# Patient Record
Sex: FEMALE | Race: BLACK OR AFRICAN AMERICAN | NOT HISPANIC OR LATINO | Employment: FULL TIME | URBAN - METROPOLITAN AREA
[De-identification: names, ages, dates, MRNs, and addresses within clinical notes are randomized per-mention and may not be internally consistent; named-entity substitution may affect disease eponyms.]

---

## 2021-02-15 ENCOUNTER — HOSPITAL ENCOUNTER (EMERGENCY)
Facility: HOSPITAL | Age: 56
Discharge: HOME | End: 2021-02-15
Attending: EMERGENCY MEDICINE | Admitting: EMERGENCY MEDICINE
Payer: COMMERCIAL

## 2021-02-15 ENCOUNTER — APPOINTMENT (EMERGENCY)
Dept: RADIOLOGY | Facility: HOSPITAL | Age: 56
End: 2021-02-15
Payer: COMMERCIAL

## 2021-02-15 VITALS
OXYGEN SATURATION: 100 % | DIASTOLIC BLOOD PRESSURE: 107 MMHG | RESPIRATION RATE: 18 BRPM | HEART RATE: 79 BPM | SYSTOLIC BLOOD PRESSURE: 142 MMHG | TEMPERATURE: 97.7 F | WEIGHT: 182 LBS
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
## 2021-02-15 DIAGNOSIS — S09.90XA INJURY OF HEAD, INITIAL ENCOUNTER: ICD-10-CM

## 2021-02-15 DIAGNOSIS — M54.2 NECK PAIN: ICD-10-CM

## 2021-02-15 DIAGNOSIS — R10.9 ABDOMINAL PAIN: ICD-10-CM

## 2021-02-15 DIAGNOSIS — M54.9 BACK PAIN: ICD-10-CM

## 2021-02-15 LAB
ANION GAP SERPL CALCULATED.3IONS-SCNC: 4 MMOL/L (ref 4–13)
BUN SERPL-MCNC: 19 MG/DL (ref 5–25)
CALCIUM SERPL-MCNC: 10.6 MG/DL (ref 8.3–10.1)
CHLORIDE SERPL-SCNC: 100 MMOL/L (ref 100–108)
CO2 SERPL-SCNC: 33 MMOL/L (ref 21–32)
CREAT SERPL-MCNC: 0.92 MG/DL (ref 0.6–1.3)
GFR SERPL CREATININE-BSD FRML MDRD: 80 ML/MIN/1.73SQ M
GLUCOSE SERPL-MCNC: 316 MG/DL (ref 65–140)
POTASSIUM SERPL-SCNC: 3.1 MMOL/L (ref 3.5–5.3)
SODIUM SERPL-SCNC: 137 MMOL/L (ref 136–145)

## 2021-02-15 PROCEDURE — 36415 COLL VENOUS BLD VENIPUNCTURE: CPT | Performed by: EMERGENCY MEDICINE

## 2021-02-15 PROCEDURE — 96374 THER/PROPH/DIAG INJ IV PUSH: CPT

## 2021-02-15 PROCEDURE — 74177 CT ABD & PELVIS W/CONTRAST: CPT

## 2021-02-15 PROCEDURE — 72125 CT NECK SPINE W/O DYE: CPT

## 2021-02-15 PROCEDURE — 80048 BASIC METABOLIC PNL TOTAL CA: CPT | Performed by: EMERGENCY MEDICINE

## 2021-02-15 PROCEDURE — 70450 CT HEAD/BRAIN W/O DYE: CPT

## 2021-02-15 PROCEDURE — 99284 EMERGENCY DEPT VISIT MOD MDM: CPT | Performed by: EMERGENCY MEDICINE

## 2021-02-15 PROCEDURE — 71260 CT THORAX DX C+: CPT

## 2021-02-15 PROCEDURE — G1004 CDSM NDSC: HCPCS

## 2021-02-15 PROCEDURE — 99284 EMERGENCY DEPT VISIT MOD MDM: CPT

## 2021-02-15 RX ORDER — KETOROLAC TROMETHAMINE 30 MG/ML
15 INJECTION, SOLUTION INTRAMUSCULAR; INTRAVENOUS ONCE
Status: COMPLETED | OUTPATIENT
Start: 2021-02-15 | End: 2021-02-15

## 2021-02-15 RX ADMIN — KETOROLAC TROMETHAMINE 15 MG: 30 INJECTION, SOLUTION INTRAMUSCULAR; INTRAVENOUS at 16:14

## 2021-02-15 RX ADMIN — IOHEXOL 100 ML: 350 INJECTION, SOLUTION INTRAVENOUS at 17:53

## 2021-02-15 NOTE — Clinical Note
Marine Thibodeaux was seen and treated in our emergency department on 2/15/2021  Diagnosis:     Gonzalez  may return to work on return date  She may return on this date: 02/18/2021         If you have any questions or concerns, please don't hesitate to call        Concetta Lucio MD    ______________________________           _______________          _______________  Hospital Representative                              Date                                Time

## 2021-02-15 NOTE — ED ATTENDING ATTESTATION
2/15/2021  ILupe MD, saw and evaluated the patient  I have discussed the patient with the resident/non-physician practitioner and agree with the resident's/non-physician practitioner's findings, Plan of Care, and MDM as documented in the resident's/non-physician practitioner's note, except where noted  All available labs and Radiology studies were reviewed  I was present for key portions of any procedure(s) performed by the resident/non-physician practitioner and I was immediately available to provide assistance  At this point I agree with the current assessment done in the Emergency Department  I have conducted an independent evaluation of this patient a history and physical is as follows:  Pt was walking out of house on Saturday and hit head no loc Pt has been ambulatory since then since yesterday ha neck and upper back pain and abd pain that is worse with cough and change of position  PE: alert tender midline c spine and bilat traps and upper thoracic liza and generalized abd tenderness and L lower ribs MDM: will image check labs  ED Course         Critical Care Time  Procedures

## 2021-02-15 NOTE — ED PROVIDER NOTES
History  Chief Complaint   Patient presents with   Kim Frankel Fall     pt reports falling and hitting her head on sat and her entire body hurts including her head  denies head strike  on 80 asa      HPI  62 yo female with PMH HTN, HLD, CAD on aspirin, SVT, DM, presents to the ED s/p slip and fall on ice 2 days ago  Pt reports she was walking outside her house when she slipped and fell backwards, striking her head on the ground  Denies LOC  Pt was able to stand and walk following the incident  No dizziness, lightheadedness, nausea, vomiting  Yesterday she developed some posterior headache, neck pain, upper back pain, left lower rib pain, and abdominal pain  No numbness or weakness in the upper or lower extremities  No chest pain or SOB  Abdominal pain is worse on the left side and exacerbated by sitting up, changing position, or coughing  No fever, chills, congestion, nausea, vomiting, diarrhea, urinary sxs  No relief of pain with aspirin at home  None       Past Medical History:   Diagnosis Date    Diabetes mellitus ([facility] [place] [address] )     Hypertension        History reviewed  No pertinent surgical history  History reviewed  No pertinent family history  I have reviewed and agree with the history as documented  E-Cigarette/Vaping    E-Cigarette Use Never User      E-Cigarette/Vaping Substances    Nicotine No     THC No     CBD No     Flavoring No     Other No     Unknown No      Social History     Tobacco Use    Smoking status: Never Smoker    Smokeless tobacco: Never Used   Substance Use Topics    Alcohol use: Not Currently    Drug use: Not Currently        Review of Systems   Constitutional: Negative for chills and fever  HENT: Negative for congestion, rhinorrhea and sore throat  Respiratory: Negative for chest tightness and shortness of breath  Cardiovascular: Negative for chest pain  Gastrointestinal: Positive for abdominal pain  Negative for diarrhea, nausea and vomiting     Genitourinary: Negative for dysuria and hematuria  Musculoskeletal: Positive for arthralgias, back pain, myalgias and neck pain  Negative for gait problem  Skin: Negative for rash  Neurological: Positive for headaches  Negative for dizziness, syncope, weakness, light-headedness and numbness  All other systems reviewed and are negative  Physical Exam  ED Triage Vitals   Temperature Pulse Respirations Blood Pressure SpO2   02/15/21 1508 02/15/21 1508 02/15/21 1508 02/15/21 1509 02/15/21 1508   97 7 °F (36 5 °C) 79 18 (!) 142/107 100 %      Temp Source Heart Rate Source Patient Position - Orthostatic VS BP Location FiO2 (%)   02/15/21 1508 02/15/21 1508 -- -- --   Oral Monitor         Pain Score       --                    Orthostatic Vital Signs  Vitals:    02/15/21 1508 02/15/21 1509   BP:  (!) 142/107   Pulse: 79        Physical Exam  Vitals signs and nursing note reviewed  Constitutional:       General: She is not in acute distress  Appearance: She is well-developed  She is not diaphoretic  HENT:      Head: Normocephalic and atraumatic  Right Ear: External ear normal       Left Ear: External ear normal       Nose: Nose normal    Eyes:      Conjunctiva/sclera: Conjunctivae normal       Pupils: Pupils are equal, round, and reactive to light  Neck:      Musculoskeletal: Normal range of motion and neck supple  Spinous process tenderness and muscular tenderness present  Cardiovascular:      Rate and Rhythm: Normal rate and regular rhythm  Heart sounds: Normal heart sounds  No murmur  No friction rub  No gallop  Pulmonary:      Effort: Pulmonary effort is normal  No respiratory distress  Breath sounds: Normal breath sounds  No wheezing, rhonchi or rales  Chest:      Chest wall: Tenderness present  Abdominal:      General: Bowel sounds are normal  There is no distension  Palpations: Abdomen is soft  There is no mass  Tenderness: There is generalized abdominal tenderness (mild)   There is no guarding or rebound  Musculoskeletal: Normal range of motion  Cervical back: She exhibits tenderness and bony tenderness  Thoracic back: She exhibits bony tenderness  Lumbar back: She exhibits no tenderness and no bony tenderness  Back:       Comments: Normal ROM extremities and joints    Lymphadenopathy:      Cervical: No cervical adenopathy  Skin:     General: Skin is warm and dry  Neurological:      Mental Status: She is alert and oriented to person, place, and time  Cranial Nerves: Cranial nerves are intact  No cranial nerve deficit  Sensory: Sensation is intact  No sensory deficit  Motor: Motor function is intact  No weakness           ED Medications  Medications   ketorolac (TORADOL) injection 15 mg (15 mg Intravenous Given 2/15/21 1614)   iohexol (OMNIPAQUE) 350 MG/ML injection (MULTI-DOSE) 100 mL (100 mL Intravenous Given 2/15/21 1753)       Diagnostic Studies  Results Reviewed     Procedure Component Value Units Date/Time    Basic metabolic panel [[id number]]  (Abnormal) Collected: 02/15/21 1613    Lab Status: Final result Specimen: Blood from Arm, Right Updated: 02/15/21 1643     Sodium 137 mmol/L      Potassium 3 1 mmol/L      Chloride 100 mmol/L      CO2 33 mmol/L      ANION GAP 4 mmol/L      BUN 19 mg/dL      Creatinine 0 92 mg/dL      Glucose 316 mg/dL      Calcium 10 6 mg/dL      eGFR 80 ml/min/1 73sq m     Narrative:      [place] guidelines for Chronic Kidney Disease (CKD):     Stage 1 with normal or high GFR (GFR > 90 mL/min/1 73 square meters)    Stage 2 Mild CKD (GFR = 60-89 mL/min/1 73 square meters)    Stage 3A Moderate CKD (GFR = 45-59 mL/min/1 73 square meters)    Stage 3B Moderate CKD (GFR = 30-44 mL/min/1 73 square meters)    Stage 4 Severe CKD (GFR = 15-29 mL/min/1 73 square meters)    Stage 5 End Stage CKD (GFR <15 mL/min/1 73 square meters)  Note: GFR calculation is accurate only with a steady state creatinine CT head without contrast   Final Result by Kirit Stephenson MD (02/15 1809)      No acute intracranial abnormality  Workstation performed: [id number]         CT spine cervical without contrast   Final Result by Kirit Stephenson MD (02/15 1827)      No acute cervical spine fracture or traumatic malalignment  Workstation performed: [id number]         CT chest abdomen pelvis w contrast   Final Result by Kirit Stephenson MD (02/15 1825)      No evidence of acute trauma in the chest, abdomen or pelvis  Workstation performed: [id number]         CT recon only thoracic spine    (Results Pending)         Procedures  Procedures      ED Course  ED Course as of Feb 15 1916   Mon Feb 15, 2021   1838 No traumatic injuries seen on imaging  Will discharge with strict return precautions and PCP follow up  MDM  62 yo female with PMH HTN, HLD, CAD on aspirin, SVT, DM presenting with headache, neck pain, upper back pain, left lower rib pain, and abdominal pain s/p slip and fall on ice with head strike 2 days ago  Will obtain CT head, CT cspine, CT C/A/P with thoracic spine recon to evaluate for traumatic injury  Will obtain BMP to check renal function prior to administering IV contrast for imaging  Will treat with toradol and reassess  If not acute findings pt will likely be discharged with strict return precautions and PCP follow up  Disposition  Final diagnoses:   Fall due to slipping on ice or snow, initial encounter   Injury of head, initial encounter   Neck pain   Back pain   Abdominal pain     Time reflects when diagnosis was documented in both MDM as applicable and the Disposition within this note     Time User Action Codes Description Comment    2/15/2021  6:39 PM Hilario Castor [W00  [id number]] Fall due to slipping on ice or snow, initial encounter     2/15/2021  6:39 PM Nyla Bnaks [T70 [id number]] Injury of head, initial encounter     2/15/2021  6:40 PM Henny Barrier Add [M54 2] Neck pain     2/15/2021  6:40 PM Henny Barrier Add [M54 9] Back pain     2/15/2021  6:40 PM Henny Barrier Add [R10 9] Abdominal pain       ED Disposition     ED Disposition Condition Date/Time Comment    Discharge Stable Mon Feb 15, 2021  6:38 PM Gonzalez Batres discharge to home/self care  Follow-up Information     Follow up With Specialties Details Why Contact Info    Theresa Walker MD Internal Medicine Schedule an appointment as soon as possible for a visit  As needed, If symptoms worsen [facility] [address]  [phone]      Infolink   call if you need a new primary care physician [phone]            There are no discharge medications for this patient  No discharge procedures on file  PDMP Review     None           ED Provider  Attending physically available and evaluated [address] Nataly managed the patient along with the ED Attending      Electronically Signed by         Fabiana Mclain MD  02/15/21 1562

## 2021-02-15 NOTE — DISCHARGE INSTRUCTIONS
Tylenol and ibuprofen for pain as needed  Return to the emergency department for severe worsening of pain, persistent vomiting, weakness in your arms or legs, inability to walk, any other new or concerning symptoms

## 2021-03-30 DIAGNOSIS — Z23 ENCOUNTER FOR IMMUNIZATION: ICD-10-CM

## 2021-10-01 PROCEDURE — U0003 INFECTIOUS AGENT DETECTION BY NUCLEIC ACID (DNA OR RNA); SEVERE ACUTE RESPIRATORY SYNDROME CORONAVIRUS 2 (SARS-COV-2) (CORONAVIRUS DISEASE [COVID-19]), AMPLIFIED PROBE TECHNIQUE, MAKING USE OF HIGH THROUGHPUT TECHNOLOGIES AS DESCRIBED BY CMS-2020-01-R: HCPCS | Performed by: FAMILY MEDICINE

## 2021-10-01 PROCEDURE — U0005 INFEC AGEN DETEC AMPLI PROBE: HCPCS | Performed by: FAMILY MEDICINE

## 2021-10-02 ENCOUNTER — LAB REQUISITION (OUTPATIENT)
Dept: LAB | Facility: HOSPITAL | Age: 56
End: 2021-10-02
Payer: COMMERCIAL

## 2021-10-02 DIAGNOSIS — Z03.818 ENCOUNTER FOR OBSERVATION FOR SUSPECTED EXPOSURE TO OTHER BIOLOGICAL AGENTS RULED OUT: ICD-10-CM

## 2021-10-02 DIAGNOSIS — Z11.59 ENCOUNTER FOR SCREENING FOR OTHER VIRAL DISEASES: ICD-10-CM

## 2021-10-02 LAB — SARS-COV-2 RNA RESP QL NAA+PROBE: NEGATIVE
